# Patient Record
(demographics unavailable — no encounter records)

---

## 2025-03-07 NOTE — HISTORY OF PRESENT ILLNESS
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 The patient was born at 37 weeks gestation There is no family history of ear deformities, and the infant is otherwise healthy. The parent reports normal feeding and elimination patterns, as well as normal development up to this point Hearing screen passed